# Patient Record
Sex: FEMALE | Race: WHITE | URBAN - METROPOLITAN AREA
[De-identification: names, ages, dates, MRNs, and addresses within clinical notes are randomized per-mention and may not be internally consistent; named-entity substitution may affect disease eponyms.]

---

## 2019-07-08 ENCOUNTER — DOCUMENTATION ONLY (OUTPATIENT)
Dept: CARE COORDINATION | Facility: CLINIC | Age: 54
End: 2019-07-08

## 2019-07-19 NOTE — TELEPHONE ENCOUNTER
FUTURE VISIT INFORMATION      FUTURE VISIT INFORMATION:    Date: 7.22.19    Time: 2:00    Location: [facility]  REFERRAL INFORMATION:    Referring provider:  Dr. Campos    Referring providers clinic:  [facility]    Reason for visit/diagnosis:  Hives    RECORDS REQUESTED FROM:       Clinic name Comments Records Status Imaging Status   [facility] 7.18.19 Dr. Campos In [facility]Care Everywhere

## 2019-07-22 ENCOUNTER — PRE VISIT (OUTPATIENT)
Dept: DERMATOLOGY | Facility: CLINIC | Age: 54
End: 2019-07-22

## 2019-07-22 ENCOUNTER — OFFICE VISIT (OUTPATIENT)
Dept: DERMATOLOGY | Facility: CLINIC | Age: 54
End: 2019-07-22
Payer: COMMERCIAL

## 2019-07-22 DIAGNOSIS — L50.8 CHRONIC URTICARIA: ICD-10-CM

## 2019-07-22 DIAGNOSIS — L50.8 CHRONIC URTICARIA: Primary | ICD-10-CM

## 2019-07-22 LAB
BASOPHILS # BLD AUTO: 0.1 10E9/L (ref 0–0.2)
BASOPHILS NFR BLD AUTO: 1.1 %
CRP SERPL-MCNC: <2.9 MG/L (ref 0–8)
D DIMER PPP FEU-MCNC: <0.3 UG/ML FEU (ref 0–0.5)
DIFFERENTIAL METHOD BLD: NORMAL
EOSINOPHIL # BLD AUTO: 0.1 10E9/L (ref 0–0.7)
EOSINOPHIL NFR BLD AUTO: 1.8 %
ERYTHROCYTE [DISTWIDTH] IN BLOOD BY AUTOMATED COUNT: 13 % (ref 10–15)
HCT VFR BLD AUTO: 41.5 % (ref 35–47)
HGB BLD-MCNC: 13.6 G/DL (ref 11.7–15.7)
IMM GRANULOCYTES # BLD: 0 10E9/L (ref 0–0.4)
IMM GRANULOCYTES NFR BLD: 0.3 %
LYMPHOCYTES # BLD AUTO: 2.5 10E9/L (ref 0.8–5.3)
LYMPHOCYTES NFR BLD AUTO: 34.9 %
MCH RBC QN AUTO: 31.1 PG (ref 26.5–33)
MCHC RBC AUTO-ENTMCNC: 32.8 G/DL (ref 31.5–36.5)
MCV RBC AUTO: 95 FL (ref 78–100)
MONOCYTES # BLD AUTO: 0.5 10E9/L (ref 0–1.3)
MONOCYTES NFR BLD AUTO: 7.1 %
NEUTROPHILS # BLD AUTO: 3.9 10E9/L (ref 1.6–8.3)
NEUTROPHILS NFR BLD AUTO: 54.8 %
NRBC # BLD AUTO: 0 10*3/UL
NRBC BLD AUTO-RTO: 0 /100
PLATELET # BLD AUTO: 279 10E9/L (ref 150–450)
RBC # BLD AUTO: 4.38 10E12/L (ref 3.8–5.2)
WBC # BLD AUTO: 7.1 10E9/L (ref 4–11)

## 2019-07-22 RX ORDER — VALACYCLOVIR HYDROCHLORIDE 1 G/1
TABLET, FILM COATED ORAL
COMMUNITY
Start: 2005-12-19

## 2019-07-22 RX ORDER — CLOBETASOL PROPIONATE 0.5 MG/G
OINTMENT TOPICAL
COMMUNITY
Start: 2018-03-01

## 2019-07-22 RX ORDER — VENLAFAXINE HYDROCHLORIDE 37.5 MG/1
CAPSULE, EXTENDED RELEASE ORAL
COMMUNITY
Start: 2018-08-06

## 2019-07-22 RX ORDER — MINOCYCLINE HYDROCHLORIDE 100 MG/1
100 CAPSULE ORAL
COMMUNITY
Start: 2019-04-11

## 2019-07-22 RX ORDER — DIPHENOXYLATE HYDROCHLORIDE AND ATROPINE SULFATE 2.5; .025 MG/1; MG/1
1 TABLET ORAL
COMMUNITY

## 2019-07-22 RX ORDER — MONTELUKAST SODIUM 10 MG/1
1 TABLET ORAL DAILY
Refills: 11 | COMMUNITY
Start: 2019-07-09

## 2019-07-22 RX ORDER — METRONIDAZOLE 7.5 MG/G
LOTION TOPICAL
COMMUNITY
Start: 2018-08-02

## 2019-07-22 RX ORDER — LORATADINE 10 MG/1
TABLET ORAL
COMMUNITY
Start: 2004-03-02

## 2019-07-22 RX ORDER — GLUCOSAMINE HCL 500 MG
TABLET ORAL
COMMUNITY
Start: 2018-01-30

## 2019-07-22 RX ORDER — SPIRONOLACTONE 50 MG/1
TABLET, FILM COATED ORAL
Refills: 3 | COMMUNITY
Start: 2018-08-09

## 2019-07-22 RX ORDER — FEXOFENADINE HCL 180 MG/1
180 TABLET ORAL
COMMUNITY
Start: 2011-05-02

## 2019-07-22 ASSESSMENT — PAIN SCALES - GENERAL: PAINLEVEL: NO PAIN (0)

## 2019-07-22 NOTE — PROGRESS NOTES
[facility] Dermatology Note      Dermatology Problem List:  1. Idiopathic urticaria vs, 20+ years  - current tx: singulair 10mg PO, zyntac 150mg qPM, zyrtec 75mg qPM, allegra 180 mg BID, claritin 10 mg qPM  - start Xolair infusions and begin decreasing anti-histamine regimen  2. Acne rosacea  - current tx: minocycline 100mg BID, metronidazole lotion as needed, venlafaxine 37.5mg for hot flashes    Encounter Date: Jul 22, 2019    CC:   Chief Complaint   Patient presents with     Allergies     Dorothy is her efor allergy consult         History of Present Illness:  Ms. Dorothy Meng is a 54 year old female who presents for initial evaluation of hives, referred by Dr. Campos. She has been diagnosed with idiopathic hives for over 20 years with intermittent flares throughout the years. The hives lasts anywhere from minutes to days and are significantly pruritic. In the last four months, there was a flare that has been controlled with IM kenalog 40mg injection, singulair 10mg PO, zyntac 150mg qPM, zyrtec 75mg qPM, allegra 180 mg BID, and claritin 10 mg qPM. She also started an elimination diet that she believes has improved her symptoms. Dr. Campos wanted a second opinion before proceeding with treatment with Xolair.    Past Medical History:   There is no problem list on file for this patient.    No past medical history on file.  No past surgical history on file.    Social History:  Patient reports that she has never smoked. She has never used smokeless tobacco.    Family History:  No family history on file.    Medications:  Current Outpatient Medications   Medication Sig Dispense Refill     calcium carbonate-vitamin D (OYSTER SHELL CALCIUM/D) 500-200 MG-UNIT tablet Take 1 tablet by mouth       Cholecalciferol (VITAMIN D3) 3000 units TABS        clobetasol (TEMOVATE) 0.05 % external ointment Apply up to twice daily as needed to affected areas only, taper with improvement, stop when clear. DO NOT use on  face, underarms, genitals       fexofenadine (ALLEGRA) 180 MG tablet Take 180 mg by mouth       loratadine (CLARITIN) 10 MG tablet        metroNIDAZOLE (METROLOTION) 0.75 % external lotion        minocycline (MINOCIN/DYNACIN) 100 MG capsule Take 100 mg by mouth       montelukast (SINGULAIR) 10 MG tablet Take 1 tablet by mouth daily  11     Multiple Vitamin (MULTI-VITAMINS) TABS Take 1 tablet by mouth       ranitidine (ZANTAC) 150 MG tablet Take 150 mg by mouth       spironolactone (ALDACTONE) 50 MG tablet   3     valACYclovir (VALTREX) 1000 mg tablet        venlafaxine (EFFEXOR-XR) 37.5 MG 24 hr capsule           Allergies   Allergen Reactions     Sulfa Drugs      Other reaction(s): Edema         Review of Systems:  -As per HPI  -Constitutional: Otherwise feeling well today, in usual state of health.  -HEENT: Patient denies nonhealing oral sores.  -Skin: As above in HPI. No additional skin concerns.    Physical exam:  Vitals: There were no vitals taken for this visit.  GEN: This is a well developed, well-nourished female in no acute distress, in a pleasant mood.    SKIN: Focused examination of the face, chest and bilateral upper extremities was performed.  -There are fine lines and dyspigmentation on sun exposed areas of the face and chest.  -Scattered brown macules on sun exposed areas.  -No other lesions of concern on areas examined.     Impression/Plan:  1. Chronic urticaria    Start Xolair infusions this week, and slowly decrease anti-histamine regimen to Allegra 180mg BID    CBC with diff, tryptase, d-Dimer, total IgE, Anti IgE Ab, DAVID, CRP    Can consider allergy patch testing if urticaria is controlled with Xolair    Return to clinic in 2-3 weeks for f/u evaluation      CC Ozzie Campos MD  [address] [id number] on close of this encounter.      Staff Involved:  Roberto RIZZO MS4, saw and examined the patient in the presence of Dr. Pelaez.  Staff Physician  Comments:  I was present with the medical student who participated in the service and in the documentation of the note. I have verified the history and personally performed the physical exam and medical decision making. I agree with the assessment and plan as documented in the note. I have reviewed and if necessary amended the note.      Heri Pelaez MD  Professor  Head of Dermato-Allergy Division  Department of Dermatology  [facility]  I spent a total of 30 minutes face to face with Dorothy Meng during today s office visit. Over 50% of this time was spent counseling the patient and/or coordinating care. Please see Assessment and Plan for details.

## 2019-07-22 NOTE — LETTER
7/22/2019       RE: Dorothy Meng  [address]  [place] [address]     Dear Colleague,    Thank you for referring your patient, Dorothy Meng, to the [facility] DERMATOLOGY at [facility]. Please see a copy of my visit note below.    [facility] Dermatology Note      Dermatology Problem List:  1. Idiopathic urticaria vs, 20+ years  - current tx: singulair 10mg PO, zyntac 150mg qPM, zyrtec 75mg qPM, allegra 180 mg BID, claritin 10 mg qPM  - start Xolair infusions and begin decreasing anti-histamine regimen  2. Acne rosacea  - current tx: minocycline 100mg BID, metronidazole lotion as needed, venlafaxine 37.5mg for hot flashes    Encounter Date: Jul 22, 2019    CC:   Chief Complaint   Patient presents with     Allergies     Dorothy is her efor allergy consult         History of Present Illness:  Ms. Dorothy Meng is a 54 year old female who presents for initial evaluation of hives, referred by Dr. Capmos. She has been diagnosed with idiopathic hives for over 20 years with intermittent flares throughout the years. The hives lasts anywhere from minutes to days and are significantly pruritic. In the last four months, there was a flare that has been controlled with IM kenalog 40mg injection, singulair 10mg PO, zyntac 150mg qPM, zyrtec 75mg qPM, allegra 180 mg BID, and claritin 10 mg qPM. She also started an elimination diet that she believes has improved her symptoms. Dr. Campos wanted a second opinion before proceeding with treatment with Xolair.    Past Medical History:   There is no problem list on file for this patient.    No past medical history on file.  No past surgical history on file.    Social History:  Patient reports that she has never smoked. She has never used smokeless tobacco.    Family History:  No family history on file.    Medications:  Current Outpatient Medications   Medication Sig Dispense Refill     calcium carbonate-vitamin D (OYSTER SHELL  CALCIUM/D) 500-200 MG-UNIT tablet Take 1 tablet by mouth       Cholecalciferol (VITAMIN D3) 3000 units TABS        clobetasol (TEMOVATE) 0.05 % external ointment Apply up to twice daily as needed to affected areas only, taper with improvement, stop when clear. DO NOT use on face, underarms, genitals       fexofenadine (ALLEGRA) 180 MG tablet Take 180 mg by mouth       loratadine (CLARITIN) 10 MG tablet        metroNIDAZOLE (METROLOTION) 0.75 % external lotion        minocycline (MINOCIN/DYNACIN) 100 MG capsule Take 100 mg by mouth       montelukast (SINGULAIR) 10 MG tablet Take 1 tablet by mouth daily  11     Multiple Vitamin (MULTI-VITAMINS) TABS Take 1 tablet by mouth       ranitidine (ZANTAC) 150 MG tablet Take 150 mg by mouth       spironolactone (ALDACTONE) 50 MG tablet   3     valACYclovir (VALTREX) 1000 mg tablet        venlafaxine (EFFEXOR-XR) 37.5 MG 24 hr capsule           Allergies   Allergen Reactions     Sulfa Drugs      Other reaction(s): Edema         Review of Systems:  -As per HPI  -Constitutional: Otherwise feeling well today, in usual state of health.  -HEENT: Patient denies nonhealing oral sores.  -Skin: As above in HPI. No additional skin concerns.    Physical exam:  Vitals: There were no vitals taken for this visit.  GEN: This is a well developed, well-nourished female in no acute distress, in a pleasant mood.    SKIN: Focused examination of the face, chest and bilateral upper extremities was performed.  -There are fine lines and dyspigmentation on sun exposed areas of the face and chest.  -Scattered brown macules on sun exposed areas.  -No other lesions of concern on areas examined.     Impression/Plan:  1. Chronic urticaria    Start Xolair infusions this week, and slowly decrease anti-histamine regimen to Allegra 180mg BID    CBC with diff, tryptase, d-Dimer, total IgE, Anti IgE Ab, DAVID, CRP    Can consider allergy patch testing if urticaria is controlled with Xolair    Return to clinic in  2-3 weeks for f/u evaluation      CC Ozzie Campos MD  [address] [address] on close of this encounter.      Staff Involved:  I, Roberto Smith MS4, saw and examined the patient in the presence of Dr. Pelaez.  Staff Physician Comments:  I was present with the medical student who participated in the service and in the documentation of the note. I have verified the history and personally performed the physical exam and medical decision making. I agree with the assessment and plan as documented in the note. I have reviewed and if necessary amended the note.      Heri Pelaez MD  Professor  Head of Dermato-Allergy Division  Department of Dermatology  [facility]  I spent a total of 30 minutes face to face with Dorothy Meng during today s office visit. Over 50% of this time was spent counseling the patient and/or coordinating care. Please see Assessment and Plan for details.

## 2019-07-22 NOTE — NURSING NOTE
Dermatology Rooming Note    Dorothy Meng's goals for this visit include:   Chief Complaint   Patient presents with     Allergies     Dorothy is her efor allergy consult     Kandi Duarte, CMA

## 2019-07-23 LAB
ANA SER QL IF: NEGATIVE
IGE SERPL-ACNC: 106 KIU/L (ref 0–114)
TRYPTASE SERPL-MCNC: 3.5 UG/L

## 2019-07-25 LAB — ANTI IGE ANTIBODY: ABNORMAL

## 2019-09-29 ENCOUNTER — HEALTH MAINTENANCE LETTER (OUTPATIENT)
Age: 54
End: 2019-09-29

## 2021-01-14 ENCOUNTER — HEALTH MAINTENANCE LETTER (OUTPATIENT)
Age: 56
End: 2021-01-14

## 2021-06-26 ENCOUNTER — HEALTH MAINTENANCE LETTER (OUTPATIENT)
Age: 56
End: 2021-06-26

## 2021-10-23 ENCOUNTER — HEALTH MAINTENANCE LETTER (OUTPATIENT)
Age: 56
End: 2021-10-23

## 2022-01-01 ENCOUNTER — HEALTH MAINTENANCE LETTER (OUTPATIENT)
Age: 57
End: 2022-01-01